# Patient Record
Sex: MALE | Race: BLACK OR AFRICAN AMERICAN
[De-identification: names, ages, dates, MRNs, and addresses within clinical notes are randomized per-mention and may not be internally consistent; named-entity substitution may affect disease eponyms.]

---

## 2017-03-07 ENCOUNTER — HOSPITAL ENCOUNTER (OUTPATIENT)
Dept: HOSPITAL 62 - RAD | Age: 67
End: 2017-03-07
Attending: UROLOGY
Payer: MEDICARE

## 2017-03-07 DIAGNOSIS — R31.21: Primary | ICD-10-CM

## 2017-03-07 PROCEDURE — 76770 US EXAM ABDO BACK WALL COMP: CPT

## 2017-07-24 ENCOUNTER — HOSPITAL ENCOUNTER (OUTPATIENT)
Dept: HOSPITAL 62 - OD | Age: 67
End: 2017-07-24
Attending: INTERNAL MEDICINE
Payer: MEDICARE

## 2017-07-24 DIAGNOSIS — J84.10: Primary | ICD-10-CM

## 2017-07-24 DIAGNOSIS — D68.0: ICD-10-CM

## 2017-07-24 PROCEDURE — 71250 CT THORAX DX C-: CPT

## 2017-07-24 PROCEDURE — 82340 ASSAY OF CALCIUM IN URINE: CPT

## 2017-07-24 PROCEDURE — 36415 COLL VENOUS BLD VENIPUNCTURE: CPT

## 2017-07-24 PROCEDURE — 82310 ASSAY OF CALCIUM: CPT

## 2017-07-24 PROCEDURE — 82164 ANGIOTENSIN I ENZYME TEST: CPT

## 2017-07-24 NOTE — RADIOLOGY REPORT (SQ)
EXAM DESCRIPTION:  CT CHEST WITHOUT



COMPLETED DATE/TIME:  7/24/2017 1:01 pm



REASON FOR STUDY:  J84.10 PULMONARY FIBROSIS J84.10  PULMONARY FIBROSIS, UNSPECIFIED



COMPARISON:  9/11/2014



TECHNIQUE:  CT scan performed of the chest without intravenous contrast.  Images reviewed with lung, 
soft tissue and bone windows.  Reconstructed coronal and sagittal MPR images reviewed.  All images st
ored on PACS.

All CT scanners at this facility use dose modulation, iterative reconstruction, and/or weight based d
osing when appropriate to reduce radiation dose to as low as reasonably achievable (ALARA).

[facility]: Dose Right  [facility]: CareDose    [facility]: Dose Right    [facility]: Teradose 4D    [facility]: Smart DotProduct



RADIATION DOSE:  Up-to-date CT equipment and radiation dose reduction techniques were employed. CTDIv
ol: 17.8 mGy. DLP: 613 mGy-cm. mGy.



LIMITATIONS:  No technical limitations.



FINDINGS:  LUNGS AND PLEURA: Paraseptal emphysematous changes in the left apex.  Stable areas of zuhair
pheral linear scarring.  Bronchiectasis in the lower lobes.  No honeycombing.  Calcified granuloma ri
ght lower lobe.  No suspicious nodules.  No effusions.

HILAR AND MEDIASTINAL STRUCTURES: No identified masses or abnormal nodes.  No obvious aneurysm.

HEART AND VASCULAR STRUCTURES: No aneurysm.  No pericardial effusion.

UPPER ABDOMEN: No significant findings.  Limited exam.

THYROID AND OTHER SOFT TISSUES: No masses.  No adenopathy.

BONES: No significant finding.

HARDWARE: None in the chest.

OTHER: No other significant findings.



IMPRESSION:  Chronic interstitial lung disease and bronchiectasis.  No fibrosis.



TECHNICAL DOCUMENTATION:  JOB ID:  [id number]

Quality ID # [id number]: Final reports with documentation of one or more dose reduction techniques (e.g., Au
tomated exposure control, adjustment of the mA and/or kV according to patient size, use of iterative 
reconstruction technique)

 2011 [employer]- All Rights Reserved

## 2017-12-08 ENCOUNTER — HOSPITAL ENCOUNTER (OUTPATIENT)
Dept: HOSPITAL 62 - ER | Age: 67
Setting detail: OBSERVATION
LOS: 1 days | Discharge: HOME | End: 2017-12-09
Attending: INTERNAL MEDICINE | Admitting: INTERNAL MEDICINE
Payer: MEDICARE

## 2017-12-08 DIAGNOSIS — E78.00: ICD-10-CM

## 2017-12-08 DIAGNOSIS — I10: ICD-10-CM

## 2017-12-08 DIAGNOSIS — I25.10: ICD-10-CM

## 2017-12-08 DIAGNOSIS — Z87.891: ICD-10-CM

## 2017-12-08 DIAGNOSIS — R07.89: Primary | ICD-10-CM

## 2017-12-08 DIAGNOSIS — M19.90: ICD-10-CM

## 2017-12-08 DIAGNOSIS — Z79.01: ICD-10-CM

## 2017-12-08 DIAGNOSIS — I82.509: ICD-10-CM

## 2017-12-08 DIAGNOSIS — E11.51: ICD-10-CM

## 2017-12-08 DIAGNOSIS — Z82.49: ICD-10-CM

## 2017-12-08 DIAGNOSIS — I48.2: ICD-10-CM

## 2017-12-08 DIAGNOSIS — K21.9: ICD-10-CM

## 2017-12-08 DIAGNOSIS — G47.33: ICD-10-CM

## 2017-12-08 DIAGNOSIS — Z79.899: ICD-10-CM

## 2017-12-08 LAB
ALBUMIN SERPL-MCNC: 4.3 G/DL (ref 3.5–5)
ALP SERPL-CCNC: 44 U/L (ref 38–126)
ALT SERPL-CCNC: 28 U/L (ref 21–72)
ANION GAP SERPL CALC-SCNC: 10 MMOL/L (ref 5–19)
APTT BLD: 33 SEC (ref 23.5–35.8)
APTT BLD: 34.6 SEC (ref 23.5–35.8)
AST SERPL-CCNC: 25 U/L (ref 17–59)
BASOPHILS # BLD AUTO: 0 10^3/UL (ref 0–0.2)
BASOPHILS NFR BLD AUTO: 1 % (ref 0–2)
BILIRUB DIRECT SERPL-MCNC: 0.4 MG/DL (ref 0–0.4)
BILIRUB SERPL-MCNC: 0.7 MG/DL (ref 0.2–1.3)
BUN SERPL-MCNC: 16 MG/DL (ref 7–20)
CALCIUM: 9.2 MG/DL (ref 8.4–10.2)
CHLORIDE SERPL-SCNC: 102 MMOL/L (ref 98–107)
CK MB SERPL-MCNC: 2.53 NG/ML (ref ?–4.55)
CK SERPL-CCNC: 138 U/L (ref 55–170)
CO2 SERPL-SCNC: 33 MMOL/L (ref 22–30)
CREAT SERPL-MCNC: 0.97 MG/DL (ref 0.52–1.25)
EOSINOPHIL # BLD AUTO: 0.1 10^3/UL (ref 0–0.6)
EOSINOPHIL NFR BLD AUTO: 1.6 % (ref 0–6)
ERYTHROCYTE [DISTWIDTH] IN BLOOD BY AUTOMATED COUNT: 16.5 % (ref 11.5–14)
GLUCOSE SERPL-MCNC: 90 MG/DL (ref 75–110)
HCT VFR BLD CALC: 44.8 % (ref 37.9–51)
HGB BLD-MCNC: 14.9 G/DL (ref 13.5–17)
HGB HCT DIFFERENCE: -0.1
LYMPHOCYTES # BLD AUTO: 2.6 10^3/UL (ref 0.5–4.7)
LYMPHOCYTES NFR BLD AUTO: 51.7 % (ref 13–45)
MAGNESIUM SERPL-MCNC: 1.9 MG/DL (ref 1.6–2.3)
MCH RBC QN AUTO: 27.1 PG (ref 27–33.4)
MCHC RBC AUTO-ENTMCNC: 33.2 G/DL (ref 32–36)
MCV RBC AUTO: 82 FL (ref 80–97)
MONOCYTES # BLD AUTO: 0.3 10^3/UL (ref 0.1–1.4)
MONOCYTES NFR BLD AUTO: 6.7 % (ref 3–13)
NEUTROPHILS # BLD AUTO: 2 10^3/UL (ref 1.7–8.2)
NEUTS SEG NFR BLD AUTO: 39 % (ref 42–78)
POTASSIUM SERPL-SCNC: 3.6 MMOL/L (ref 3.6–5)
PROT SERPL-MCNC: 7.3 G/DL (ref 6.3–8.2)
PROTHROMBIN TIME: 23.6 SEC (ref 11.4–15.4)
PROTHROMBIN TIME: 24.2 SEC (ref 11.4–15.4)
RBC # BLD AUTO: 5.49 10^6/UL (ref 4.35–5.55)
SODIUM SERPL-SCNC: 144.8 MMOL/L (ref 137–145)
TROPONIN I SERPL-MCNC: < 0.012 NG/ML
WBC # BLD AUTO: 5.1 10^3/UL (ref 4–10.5)

## 2017-12-08 PROCEDURE — 93010 ELECTROCARDIOGRAM REPORT: CPT

## 2017-12-08 PROCEDURE — 83735 ASSAY OF MAGNESIUM: CPT

## 2017-12-08 PROCEDURE — 85610 PROTHROMBIN TIME: CPT

## 2017-12-08 PROCEDURE — 85730 THROMBOPLASTIN TIME PARTIAL: CPT

## 2017-12-08 PROCEDURE — 85025 COMPLETE CBC W/AUTO DIFF WBC: CPT

## 2017-12-08 PROCEDURE — 82553 CREATINE MB FRACTION: CPT

## 2017-12-08 PROCEDURE — 80048 BASIC METABOLIC PNL TOTAL CA: CPT

## 2017-12-08 PROCEDURE — 71010: CPT

## 2017-12-08 PROCEDURE — 84484 ASSAY OF TROPONIN QUANT: CPT

## 2017-12-08 PROCEDURE — 80053 COMPREHEN METABOLIC PANEL: CPT

## 2017-12-08 PROCEDURE — 93005 ELECTROCARDIOGRAM TRACING: CPT

## 2017-12-08 PROCEDURE — 82550 ASSAY OF CK (CPK): CPT

## 2017-12-08 PROCEDURE — 99285 EMERGENCY DEPT VISIT HI MDM: CPT

## 2017-12-08 PROCEDURE — 36415 COLL VENOUS BLD VENIPUNCTURE: CPT

## 2017-12-08 RX ADMIN — METOPROLOL TARTRATE SCH MG: 25 TABLET, FILM COATED ORAL at 22:21

## 2017-12-08 NOTE — PDOC H&P
History of Present Illness


Admission Date/PCP: 


  12/08/17 05:40





  DARIEN GUERRERO





Patient complains of: Chest pain


History of Present Illness: 


CARLOTTA KELLEY is a 66 year old male known to my practice who presented to 

ED with chest pain that started about 45 minutes prior to arrival. Patient 

reported that he was recently started on CPAP machine for obstructive sleep 

apnea. he woke up from sleep due to onset of his chest pain. Patient localized 

pain to left side of his chest without any associated radiation to left upper 

extremity, palpitation, shortness of breath, nausea or diaphoresis. he has 

history of Atrial fibrillation and have been compliant with Coumadin therapy. 

Patient reported compliance with CPAP usage. Spouse reported that she noticed 

his abdomen have been bigger than usual since he started using the CPAP 

machine. Patient has history of GERD among his morbidities. His initial 

evaluation in the ED was remarkable for atrial fibrillation with rapid 

ventricular rate. He reported recent stress test about 16 months ago that was 

unrevealing with regard to ischemic issue. His morbidities include chronic 

atrial fibrillation, Hypertension, Hyperlipidemia, Peripheral Vascular Disease, 

Obstructive sleep apnea, Asthma, GERD, and Osteoarthritis. He reported 

significant cardiovascular disease including Hypertension, CAD, DM Type 2, and 

Hyperlipidemia.





Past Medical History


Cardiac Medical History: Reports: Atrial Fibrillation, Hyperlipidema, 

Hypertension, Peripheral Vascular Disease


Pulmonary Medical History: Reports: Asthma


   Denies: Tuberculosis


GI Medical History: Reports: Gastroesophageal Reflux Disease


Musculoskeltal Medical History: Reports: Arthritis





Past Surgical History


Past Surgical History: Reports: Vascular Surgery


   Denies: Pacemaker





Social History


Smoking Status: Former Smoker


Frequency of Alcohol Use: None


Hx Recreational Drug Use: No


Hx Prescription Drug Abuse: No





Family History


Family History: CAD, DM, Hyperlipidemia, Hypertension


Parental Family History Reviewed: Yes


Children Family History Reviewed: Yes


Sibling(s) Family History Reviewed.: Yes





Medication/Allergy


Home Medications: 








Warfarin Sodium 10 mg PO SUMOTUTHFRSA@1000 09/11/14 


Warfarin Sodium 15 mg PO WE@1000 09/11/14 


Losartan Potassium 50 mg PO DAILY #30 tablet 09/12/14 


Metoprolol Tartrate [Lopressor 25 mg Tablet] 25 mg PO Q12 #60 tablet 09/12/14 


Duloxetine HCl [Cymbalta 20 Mg Capsule.Dr] 40 mg PO QHS 12/08/17 


Hydrochlorothiazide [Hydrodiuril 12.5 mg Capsule] 12.5 mg PO QAM 12/08/17 


Pantoprazole Sodium [Protonix] 20 mg PO Q6AM 12/08/17 








Allergies/Adverse Reactions: 


 





No Known Allergies Allergy (Verified 12/08/17 06:23)


 











Review of Systems


Constitutional: ABSENT: chills, fever(s), headache(s), weight gain, weight loss


Eyes: ABSENT: visual disturbances


Ears: ABSENT: hearing changes


Nose, Mouth, and Throat: ABSENT: headache(s), mouth pain, sore throat, vertigo


Cardiovascular: PRESENT: chest pain.  ABSENT: as per HPI, dyspnea on exertion, 

edema, orthropnea, palpitations, other


Respiratory: ABSENT: cough, hemoptysis


Gastrointestinal: ABSENT: abdominal pain, constipation, diarrhea, hematemesis, 

hematochezia, nausea, vomiting


Genitourinary: ABSENT: dysuria, hematuria


Musculoskeletal: ABSENT: joint swelling


Integumentary: ABSENT: rash, wounds


Neurological: ABSENT: abnormal gait, abnormal speech, confusion, dizziness, 

focal weakness, syncope


Psychiatric: ABSENT: anxiety, depression, homidical ideation, suicidal ideation


Endocrine: ABSENT: cold intolerance, heat intolerance, menstrual abnormalities, 

polydipsia, polyuria


Hematologic/Lymphatic: ABSENT: easy bleeding, easy bruising, lymphadenopathy


Allergic/Immunologic: ABSENT: seasonal rhinorrhea





Physical Exam


Vital Signs: 


 











Temp Pulse Resp BP Pulse Ox


 


 97.7 F   76   18   127/74 H  97 


 


 12/08/17 15:51  12/08/17 15:51  12/08/17 15:51  12/08/17 15:51  12/08/17 15:51








 Intake & Output











 12/07/17 12/08/17 12/09/17





 06:59 06:59 06:59


 


Weight   117.9 kg











General appearance: PRESENT: no acute distress, cooperative, obese


Head exam: PRESENT: atraumatic, normocephalic


Eye exam: PRESENT: conjunctiva pink, EOMI, PERRLA.  ABSENT: scleral icterus


Ear exam: PRESENT: normal external ear exam


Mouth exam: PRESENT: moist, tongue midline


Throat exam: ABSENT: post pharyngeal erythema, tonsillar erythema, tonsillar 

exudate, tonsillogmegaly


Neck exam: PRESENT: full ROM.  ABSENT: carotid bruit, JVD, lymphadenopathy, 

thyromegaly


Respiratory exam: PRESENT: clear to auscultation mayito


Cardiovascular exam: PRESENT: RRR, +S1, +S2.  ABSENT: diastolic murmur, rubs, 

systolic murmur


Pulses: PRESENT: normal dorsalis pedis pul, +2 pedal pulses bilateral


Vascular exam: PRESENT: normal capillary refill.  ABSENT: pallor


GI/Abdominal exam: PRESENT: normal bowel sounds, soft.  ABSENT: distended, 

guarding, mass, organolmegaly, rebound, tenderness


Rectal exam: PRESENT: deferred


Extremities exam: ABSENT: pedal edema - support


Musculoskeletal exam: PRESENT: ambulatory, normal inspection.  ABSENT: 

tenderness


Neurological exam: PRESENT: alert, awake, oriented to person, oriented to place

, oriented to time, oriented to situation, CN II-XII grossly intact.  ABSENT: 

motor sensory deficit


Psychiatric exam: PRESENT: appropriate affect, normal mood.  ABSENT: homicidal 

ideation, suicidal ideation


Skin exam: PRESENT: dry, intact, warm.  ABSENT: cyanosis, rash





Results


Laboratory Results: 


I reviewed hos laboratory results on [employer] and form significant part of my 

medical decision making. 











  12/08/17 12/08/17





  07:58 14:52


 


Troponin I  0.032  0.041











Impressions: 


 





Chest X-Ray  12/08/17 03:51


IMPRESSION:


 


No acute cardiopulmonary findings.


 


 


 2011 [employer]- All Rights Reserved


 














Assessment & Plan





- Diagnosis


(1) Chest pain, rule out acute myocardial infarction


Is this a current diagnosis for this admission?: Yes   


Plan: 


See attending physician orders.








(2) Chronic atrial fibrillation with rapid ventricular response


Is this a current diagnosis for this admission?: Yes   


Plan: 


See attending physician orders.








(3) Obstructive sleep apnea on CPAP


Is this a current diagnosis for this admission?: Yes   


Plan: 


See attending physician orders.








(4) GERD (gastroesophageal reflux disease)


Qualifiers: 


   Esophagitis presence: without esophagitis   Qualified Code(s): K21.9 - Gastro

-esophageal reflux disease without esophagitis   


Is this a current diagnosis for this admission?: Yes   


Plan: 


See attending physician orders.








(5) HTN (hypertension)


Qualifiers: 


   Hypertension type: essential hypertension   Qualified Code(s): I10 - 

Essential (primary) hypertension   


Is this a current diagnosis for this admission?: Yes   


Plan: 


See attending physician orders.








(6) HLD (hyperlipidemia)


Qualifiers: 


   Hyperlipidemia type: pure hypercholesterolemia   Qualified Code(s): E78.00 - 

Pure hypercholesterolemia, unspecified; E78.0 - Pure hypercholesterolemia   


Is this a current diagnosis for this admission?: Yes   


Plan: 


See attending physician orders.








(7) DVT (deep venous thrombosis)


Qualifiers: 


   DVT location: lower extremity   Chronicity: chronic   Laterality: 

unspecified laterality 


Is this a current diagnosis for this admission?: Yes   


Plan: 


See attending physician orders.








- Time


Time Spent: 50 to 70 Minutes


Medications reviewed and adjusted accordingly: Yes


Anticipated discharge: Home


Within: within 24 hours





- Plan Summary


Plan Summary: 





See attending physician orders.

## 2017-12-08 NOTE — RADIOLOGY REPORT (SQ)
EXAM DESCRIPTION: 



CHEST SINGLE VIEW



CLINICAL HISTORY: 



66 years, Male, chest  pain



COMPARISON:

9/11/2014.



NUMBER OF VIEWS:



1.



TECHNIQUE:

Frontal



LIMITATIONS:

None.



FINDINGS:



Moderate lung volume, small stable atelectasis or scar of the

right mid lung field compared with exam from September 2014,

normal cardiac silhouette size, and intact bony thorax. 



IMPRESSION:



No acute cardiopulmonary findings.

 



 2011 [employer]- All Rights Reserved

## 2017-12-08 NOTE — ER DOCUMENT REPORT
ED General





- General


Chief Complaint: Chest Pain


Stated Complaint: CHEST PAIN


Time Seen by Provider: 12/08/17 03:52


TRAVEL OUTSIDE OF THE [place] IN LAST 30 DAYS: No





- HPI


Patient complains to provider of: Chest pain


Notes: 





Patient coming in with chest pain chest tightness and of the chest woke up from 

his sleep approximate 45 minutes prior to arrival to the ER.  Patient states 

does have a history of pain like this before patient states he has had a stress 

test greater than a year ago states otherwise negative no stents no history of 

MI.  Patient does have a history of hypertension.  Patient also has a history 

of atrial fibrillation and is on Coumadin.  Denies any diaphoresis shortness of 

breath denies any trauma or recent travel.  Patient resting comfortably upon my 

evaluation states chest pain is mostly resolved at this time.





- Related Data


Allergies/Adverse Reactions: 


 





No Known Allergies Allergy (Verified 09/29/16 11:08)


 











Past Medical History





- Social History


Smoking Status: Never Smoker


Chew tobacco use (# tins/day): No


Frequency of alcohol use: None


Drug Abuse: None


Family History: CAD, DM, Hyperlipidemia, Hypertension


Patient has suicidal ideation: No


Patient has homicidal ideation: No





- Past Medical History


Cardiac Medical History: Reports: Hx Atrial Fibrillation, Hx 

Hypercholesterolemia, Hx Hypertension, Hx Peripheral Vascular Disease


Pulmonary Medical History: Reports: Hx Asthma


   Denies: Hx Tuberculosis


Renal/ Medical History: Denies: Hx Peritoneal Dialysis


GI Medical History: Reports: Hx Gastroesophageal Reflux Disease


Musculoskeltal Medical History: Reports Hx Arthritis


Past Surgical History: Reports: Hx Vascular Surgery.  Denies: Hx Pacemaker





- Immunizations


Immunizations up to date: Yes


Hx Diphtheria, Pertussis, Tetanus Vaccination: Yes


Hx Pneumococcal Vaccination: 10/01/11





Review of Systems





- Review of Systems


Constitutional: No symptoms reported


EENT: No symptoms reported


Cardiovascular: Chest pain


Respiratory: No symptoms reported


Gastrointestinal: No symptoms reported


Genitourinary: No symptoms reported


Male Genitourinary: No symptoms reported


Musculoskeletal: No symptoms reported


Skin: No symptoms reported


Hematologic/Lymphatic: No symptoms reported


Neurological/Psychological: No symptoms reported


-: Yes All other systems reviewed and negative





Physical Exam





- Vital signs


Vitals: 


 











Pulse Ox


 


 98 


 


 12/08/17 03:51











Interpretation: Normal





- General


General appearance: Appears well, Alert





- HEENT


Head: Normocephalic, Atraumatic


Eyes: Normal


Pupils: PERRL





- Respiratory


Respiratory status: No respiratory distress


Chest status: Nontender


Breath sounds: Normal


Chest palpation: Normal





- Cardiovascular


Rhythm: Regular


Heart sounds: Normal auscultation


Murmur: No





- Abdominal


Inspection: Normal


Distension: No distension


Bowel sounds: Normal


Tenderness: Nontender


Organomegaly: No organomegaly





- Back


Back: Normal, Nontender





- Extremities


General upper extremity: Normal inspection, Nontender, Normal color, Normal ROM

, Normal temperature


General lower extremity: Normal inspection, Nontender, Normal color, Normal ROM

, Normal temperature, Normal weight bearing.  No: Artis's sign





- Neurological


Neuro grossly intact: Yes


Cognition: Normal


Orientation: AAOx4


[place] Coma Scale Eye Opening: Spontaneous


[place] Coma Scale Verbal: Oriented


[place] Coma Scale Motor: Obeys Commands


[place]ime Coma Scale Total: 15


Speech: Normal


Motor strength normal: LUE, RUE, LLE, RLE


Sensory: Normal





- Psychological


Associated symptoms: Normal affect, Normal mood





- Skin


Skin Temperature: Warm


Skin Moisture: Dry


Skin Color: Normal





Course





- Re-evaluation


Re-evalutation: 





12/08/17 06:20


EKG troponin negative chest x-ray is also negative.  Discussed with PCP will 

admit the patient for further cardiac evaluation





- Vital Signs


Vital signs: 


 











Temp Pulse Resp BP Pulse Ox


 


 98.3 F   67   20   125/81   96 


 


 12/08/17 04:03  12/08/17 04:03  12/08/17 05:01  12/08/17 05:01  12/08/17 05:01














- Laboratory


Result Diagrams: 


 12/08/17 04:34





 12/08/17 04:34


Laboratory results interpreted by me: 


 











  12/08/17 12/08/17 12/08/17





  04:34 04:34 04:34


 


RDW  16.5 H  


 


Seg Neutrophils %  39.0 L  


 


Lymphocytes %  51.7 H  


 


PT    23.6 H


 


Carbon Dioxide   33 H 














Discharge





- Discharge


Clinical Impression: 


 Chest pain, rule out acute myocardial infarction





Condition: Good


Disposition: ADMITTED AS OBSERVATION


Admitting Provider: Matheus


Unit Admitted: Telemetry


Referrals: 


DARIEN GUERRERO MD [Primary Care Provider] - Follow up as needed

## 2017-12-09 VITALS — SYSTOLIC BLOOD PRESSURE: 134 MMHG | DIASTOLIC BLOOD PRESSURE: 76 MMHG

## 2017-12-09 LAB
ANION GAP SERPL CALC-SCNC: 7 MMOL/L (ref 5–19)
BASOPHILS # BLD AUTO: 0.1 10^3/UL (ref 0–0.2)
BASOPHILS NFR BLD AUTO: 1.3 % (ref 0–2)
BUN SERPL-MCNC: 13 MG/DL (ref 7–20)
CALCIUM: 8.9 MG/DL (ref 8.4–10.2)
CHLORIDE SERPL-SCNC: 104 MMOL/L (ref 98–107)
CK MB SERPL-MCNC: 1.72 NG/ML (ref ?–4.55)
CK SERPL-CCNC: 111 U/L (ref 55–170)
CO2 SERPL-SCNC: 30 MMOL/L (ref 22–30)
CREAT SERPL-MCNC: 0.78 MG/DL (ref 0.52–1.25)
EOSINOPHIL # BLD AUTO: 0.1 10^3/UL (ref 0–0.6)
EOSINOPHIL NFR BLD AUTO: 2.6 % (ref 0–6)
ERYTHROCYTE [DISTWIDTH] IN BLOOD BY AUTOMATED COUNT: 16.6 % (ref 11.5–14)
GLUCOSE SERPL-MCNC: 92 MG/DL (ref 75–110)
HCT VFR BLD CALC: 42.6 % (ref 37.9–51)
HGB BLD-MCNC: 14.4 G/DL (ref 13.5–17)
HGB HCT DIFFERENCE: 0.6
LYMPHOCYTES # BLD AUTO: 2.3 10^3/UL (ref 0.5–4.7)
LYMPHOCYTES NFR BLD AUTO: 49.8 % (ref 13–45)
MCH RBC QN AUTO: 27.7 PG (ref 27–33.4)
MCHC RBC AUTO-ENTMCNC: 33.9 G/DL (ref 32–36)
MCV RBC AUTO: 82 FL (ref 80–97)
MONOCYTES # BLD AUTO: 0.3 10^3/UL (ref 0.1–1.4)
MONOCYTES NFR BLD AUTO: 7.1 % (ref 3–13)
NEUTROPHILS # BLD AUTO: 1.8 10^3/UL (ref 1.7–8.2)
NEUTS SEG NFR BLD AUTO: 39.2 % (ref 42–78)
POTASSIUM SERPL-SCNC: 4 MMOL/L (ref 3.6–5)
PROTHROMBIN TIME: 24.6 SEC (ref 11.4–15.4)
RBC # BLD AUTO: 5.21 10^6/UL (ref 4.35–5.55)
SODIUM SERPL-SCNC: 141.4 MMOL/L (ref 137–145)
TROPONIN I SERPL-MCNC: < 0.012 NG/ML
WBC # BLD AUTO: 4.5 10^3/UL (ref 4–10.5)

## 2017-12-09 RX ADMIN — METOPROLOL TARTRATE SCH MG: 25 TABLET, FILM COATED ORAL at 09:12

## 2017-12-09 NOTE — PDOC DISCHARGE SUMMARY
General





- Admit/Disc Date/PCP


Admission Date/Primary Care Provider: 


  12/08/17 05:40





  DARIEN YOLANDA





Discharge Date: 12/09/17





- Discharge Diagnosis


(1) Chest pain, rule out acute myocardial infarction


Is this a current diagnosis for this admission?: Yes   





(2) Chronic atrial fibrillation with rapid ventricular response


Is this a current diagnosis for this admission?: Yes   





(3) Obstructive sleep apnea on CPAP


Is this a current diagnosis for this admission?: Yes   





(4) GERD (gastroesophageal reflux disease)


Is this a current diagnosis for this admission?: Yes   





(5) HTN (hypertension)


Is this a current diagnosis for this admission?: Yes   





(6) HLD (hyperlipidemia)


Is this a current diagnosis for this admission?: Yes   





(7) DVT (deep venous thrombosis)


Is this a current diagnosis for this admission?: Yes   





- Additional Information


Home Medications: 








Warfarin Sodium 10 mg PO SUMOTUTHFRSA@1000 09/11/14 


Warfarin Sodium 15 mg PO WE@1000 09/11/14 


Losartan Potassium 50 mg PO DAILY #30 tablet 09/12/14 


Metoprolol Tartrate [Lopressor 25 mg Tablet] 25 mg PO Q12 #60 tablet 09/12/14 


Duloxetine HCl [Cymbalta 20 Mg Capsule.Dr] 40 mg PO QHS 12/08/17 


Hydrochlorothiazide [Hydrodiuril 12.5 mg Capsule] 12.5 mg PO QAM 12/08/17 


Pantoprazole Sodium [Protonix] 20 mg PO Q6AM 12/08/17 











History of Present Illness


History of Present Illness: 


CARLOTTA KELLEY is a 66 year old male known to my practice who presented to 

ED with chest pain that started about 45 minutes prior to arrival. Patient 

reported that he was recently started on CPAP machine for obstructive sleep 

apnea. he woke up from sleep due to onset of his chest pain. Patient localized 

pain to left side of his chest without any associated radiation to left upper 

extremity, palpitation, shortness of breath, nausea or diaphoresis. he has 

history of Atrial fibrillation and have been compliant with Coumadin therapy. 

Patient reported compliance with CPAP usage. Spouse reported that she noticed 

his abdomen have been bigger than usual since he started using the CPAP 

machine. Patient has history of GERD among his morbidities. His initial 

evaluation in the ED was remarkable for atrial fibrillation with rapid 

ventricular rate. He reported recent stress test about 16 months ago that was 

unrevealing with regard to ischemic issue. His morbidities include chronic 

atrial fibrillation, Hypertension, Hyperlipidemia, Peripheral Vascular Disease, 

Obstructive sleep apnea, Asthma, GERD, and Osteoarthritis. He reported 

significant cardiovascular disease including Hypertension, CAD, DM Type 2, and 

Hyperlipidemia.





Hospital Course


Hospital Course: 


Patient chest pain resolved without any significant management intervention 

beyond administration of Aspirin and supplemental oxygen. His cardiac enzymes 

were within normal limits. He denied any chest pain or difficulty with 

breathing this morning. Patient was instructed to use CPAP machine mask 

appropriately and postural adjustment to reduce reflux recurrence.





Physical Exam


Vital Signs: 


 











Temp Pulse Resp BP Pulse Ox


 


 98.6 F   73   16   134/76 H  96 


 


 12/09/17 08:00  12/09/17 08:00  12/09/17 08:00  12/09/17 08:00  12/09/17 08:00








 Intake & Output











 12/08/17 12/09/17 12/10/17





 06:59 06:59 06:59


 


Intake Total  1708 


 


Balance  1708 


 


Weight  117.9 kg 











General appearance: PRESENT: no acute distress, obese


Head exam: PRESENT: atraumatic, normocephalic


Eye exam: PRESENT: conjunctiva pink, EOMI, PERRLA.  ABSENT: scleral icterus


Mouth exam: PRESENT: moist


Respiratory exam: PRESENT: clear to auscultation mayito


Cardiovascular exam: PRESENT: RRR.  ABSENT: diastolic murmur, rubs, systolic 

murmur


GI/Abdominal exam: PRESENT: normal bowel sounds, soft.  ABSENT: distended, 

guarding, mass, organolmegaly, rebound, tenderness


Extremities exam: ABSENT: pedal edema


Musculoskeletal exam: PRESENT: normal inspection


Neurological exam: PRESENT: alert, awake, oriented to person, oriented to place

, oriented to time, oriented to situation, CN II-XII grossly intact.  ABSENT: 

motor sensory deficit


Psychiatric exam: PRESENT: appropriate affect, normal mood.  ABSENT: homicidal 

ideation, suicidal ideation


Skin exam: PRESENT: dry, intact, warm.  ABSENT: cyanosis, rash





Results


Laboratory Results: 


 





 12/09/17 05:10 





 12/09/17 05:10 





 











  12/09/17 12/09/17





  05:10 05:10


 


WBC  4.5 


 


RBC  5.21 


 


Hgb  14.4 


 


Hct  42.6 


 


MCV  82 


 


MCH  27.7 


 


MCHC  33.9 


 


RDW  16.6 H 


 


Plt Count  154 


 


Seg Neutrophils %  39.2 L 


 


Lymphocytes %  49.8 H 


 


Monocytes %  7.1 


 


Eosinophils %  2.6 


 


Basophils %  1.3 


 


Absolute Neutrophils  1.8 


 


Absolute Lymphocytes  2.3 


 


Absolute Monocytes  0.3 


 


Absolute Eosinophils  0.1 


 


Absolute Basophils  0.1 


 


Sodium   141.4


 


Potassium   4.0


 


Chloride   104


 


Carbon Dioxide   30


 


Anion Gap   7


 


BUN   13


 


Creatinine   0.78


 


Est GFR ( Amer)   > 60


 


Est GFR (Non-Af Amer)   > 60


 


Glucose   92


 


Calcium   8.9








 











  12/08/17 12/08/17 12/09/17





  07:58 14:52 05:10


 


Creatine Kinase    111


 


CK-MB (CK-2)   


 


Troponin I  0.032  0.041 














  12/09/17





  05:10


 


Creatine Kinase 


 


CK-MB (CK-2)  1.72


 


Troponin I  < 0.012











Impressions: 


 





Chest X-Ray  12/08/17 03:51


IMPRESSION:


 


No acute cardiopulmonary findings.


 


 


 2011 [employer]- All Rights Reserved


 














Qualifiers


**PATEINT BEING DISCHARGED WITH ANY OF THE FOLLOWING DIAGNOSIS?: No





Plan


Discharge Plan: 


Discharge home today. Keep prior office appointment for followup on 01/23/2018 

at 11am.

## 2018-03-05 NOTE — RADIOLOGY REPORT (SQ)
EXAM DESCRIPTION:  U/S ABDOMEN COMPLETE W/O DOP



COMPLETED DATE/TIME:  3/5/2018 8:30 am



REASON FOR STUDY:  EPIGASTRIC PAIN (R10.13) R10.13  EPIGASTRIC PAIN R07.9  CHEST PAIN, UNSPECIFIED K4
4.9  DIAPHRAGMATIC HERNIA WITHOUT OBSTRUCTION OR GANGRENE



COMPARISON:  None.



TECHNIQUE:  Dynamic and static grayscale images acquired of the abdomen and recorded on PACS. Additio
nal selected color Doppler and spectral images recorded.



LIMITATIONS:  Study limited due to acoustical interference from fat or from air in the bowel.



FINDINGS:  PANCREAS: Obscured.

LIVER: Echotexture is coarse with increased echogenicity consistent with fatty infiltration.

LIVER VASCULATURE: Normal directional flow of the main portal vein and hepatic veins.

GALLBLADDER: No stones. Normal wall thickness. No pericholecystic fluid.

ULTRASOUND-DETECTED SCOTT'S SIGN: Negative.

INTRAHEPATIC DUCTS AND COMMON DUCT: CBD and intrahepatic ducts normal caliber. No filling defects.

INFERIOR VENA CAVA: Normal flow.

AORTA: No aneurysm.

RIGHT KIDNEY:  Normal size.   Normal echogenicity.   No solid or suspicious masses.   No hydronephros
is.   No calcifications.

LEFT KIDNEY:  Normal size.   Normal echogenicity.   No solid or suspicious masses.   No hydronephrosi
s.   No calcifications.

SPLEEN:Normal size. No solid masses.

PERITONEAL AND PLEURAL SPACES: No ascites or effusions.

OTHER: No other significant finding.



IMPRESSION:  Fatty liver.  No acute findings.



TECHNICAL DOCUMENTATION:  JOB ID:  [id number]

 2011 [employer]- All Rights Reserved



Reading location - IP/workstation name: [email or web address]

## 2018-03-05 NOTE — RADIOLOGY REPORT (SQ)
EXAM DESCRIPTION:  UGI SERIES



COMPLETED DATE/TIME:  3/5/2018 9:06 am



REASON FOR STUDY:  EPIGATRIC PAIN (R10.13), CHEST PAIN (R07.9), DIAGHRAGMATIC HERNIA (K44.9) R10.13  
EPIGASTRIC PAIN R07.9  CHEST PAIN, UNSPECIFIED K44.9  DIAPHRAGMATIC HERNIA WITHOUT OBSTRUCTION OR PAMELA
GRENE



COMPARISON:  CT LIMITED ABDOMEN 9/22/2016



TECHNIQUE:  Under fluoroscopic guidance, patient ingested effervescent granules followed by thick and
 thin barium.  Fluoroscopic spot images and routine radiographic images acquired and stored on PACS.

12 MM BARIUM TABLET GIVEN: Yes.

No significant delay in passage.



LIMITATIONS:  None.



FLUOROSCOPY TIME:  FLUORO TIME: 0.7 minutes

12 fluoroscopy images saved to PACS.



FINDINGS:  NEUROMUSCULAR COORDINATION OF SWALLOW: Normal.  No aspiration.

ESOPHAGEAL MOTILITY: Normal primary peristalsis.  Mild tertiary contractions noted throughout the mid
 and distal esophagus suggesting mild esophageal dysmotility.  No delay in passage of tablet.  No eso
phageal spasm.

ESOPHAGEAL MUCOSA: Normal mucosa without masses or ulceration.

GASTRO-ESOPHAGEAL JUNCTION: No hiatal hernia or reflux.

STOMACH: A mildly large stomach is identified with the fundus of the stomach positioned horizontally 
under the left hemidiaphragm.  No volvulus, hiatal hernia, or obstruction is seen.  The mucosa is nor
mal without masses or ulcerations.

GASTRIC OUTLET: No delay in emptying.  Normal pylorus.

DUODENAL BULB: Normal distention. No spasm or ulceration.

DUODENUM: Mucosa normal.  No extrinsic masses or malrotation.

PROXIMAL SMALL BOWEL: Mucosa normal.  No extrinsic masses or malrotation.

NON-GI TRACT STRUCTURES: No significant finding.

OTHER: No other significant finding.



IMPRESSION:  1. MILD ESOPHAGEAL DYSMOTILITY.  NO DELAY PASSING A BARIUM TABLET.

2.  MILDLY LARGE STOMACH WITH THE FUNDUS POSITIONED HORIZONTALLY UNDER THE LEFT HEMIDIAPHRAGM.  NO VO
LVULUS, HIATAL HERNIA, OBSTRUCTION, OR MUCOSAL DEFECTS.



COMMENT:  Quality :  Final reports for procedures using fluoroscopy that document radiation exp
osure indices, or exposure time and number of fluorographic images (if radiation exposure indices are
 not available)



TECHNICAL DOCUMENTATION:  JOB ID:  [id number]

 2011 [email or web address]- All Rights Reserved



Reading location - IP/workstation name: Counts include 234 beds at the [facility]

## 2018-05-08 NOTE — XCELERA REPORT
[address] [address]

                             Tel: [phone]

                             Fax: [phone]



                     Lower Extremity Venous Evaluation

____________________________________________________________________________



Name: CARLOTTA KELLEY

MRN: [id number]                Age: 67 yrs

Gender: Male                   : 1950

Patient Status: Outpatient     Patient Location: 

Account #: [id number]

Study Date: 2018 03:55 PM

Accession #: [id number]

____________________________________________________________________________



Procedure: Color flow and duplex imaging of the veins of the right lower

extremity as well as the left Common Femoral vein.

Reason For Study: RLE PAIN





Ordering Physician: DARIEN GUERRERO

Performed By: Kiesha Urena

____________________________________________________________________________



____________________________________________________________________________





Right Sided Venous Evaluation

Enlarged and no flow in upper thigh Greater Saphenous vein. Reflux present

in the deep veins.

Otherwise normal vessel filling wall to wall, compression and augmentation

as well as Colour flow down to the infrageniculate veins.



Left Sided Venous Evaluation

The left common femoral vein is fully compressible. Spontaneous and phasic

flow is present in the left common femoral vein.

____________________________________________________________________________





Interpretation Summary

No duplex evidence of DVT or obstruction in the right lower extremity nor

in the left Common Femoral vein. Superficial phlebitis in the Greater

Saphenous vein. The patient is noted to be on Warfarin.

____________________________________________________________________________



____________________________________________________________________________



Electronically signed by:      Lennox Williams      on 2018 07:38 PM



CC: DARIEN GUERRERO

>

Williams, Lennox

## 2018-09-10 NOTE — RADIOLOGY REPORT (SQ)
EXAM DESCRIPTION:  CT CHEST WITHOUT



COMPLETED DATE/TIME:  9/10/2018 9:00 am



REASON FOR STUDY:  BRONCHIESCTASIS J47.9  BRONCHIECTASIS, UNCOMPLICATED D86.0  SARCOIDOSIS OF LUNG



COMPARISON:  7/24/2017



TECHNIQUE:  CT scan performed of the chest without intravenous contrast.  Images reviewed with lung, 
soft tissue and bone windows.  Reconstructed coronal and sagittal MPR images reviewed.  All images st
ored on PACS.

All CT scanners at this facility use dose modulation, iterative reconstruction, and/or weight based d
osing when appropriate to reduce radiation dose to as low as reasonably achievable (ALARA).

[facility]: Dose Right  [facility]: CareDose    [facility]: Dose Right    [facility]: Teradose 4D    [facility]: Smart Technologies



RADIATION DOSE:  CT Rad equipment meets quality standard of care and radiation dose reduction techniq
ues were employed. CTDIvol: 18.5 mGy. DLP: 645 mGy-cm. mGy.



LIMITATIONS:  No technical limitations.



FINDINGS:  LUNGS AND PLEURA: Stable peripheral areas of bandlike scarring and associated bronchiectas
is.  No developing nodules or effusions.  No honeycombing.  Calcified granuloma right lower lobe.

HILAR AND MEDIASTINAL STRUCTURES: No identified masses or abnormal nodes.  No obvious aneurysm.

HEART AND VASCULAR STRUCTURES: No aneurysm.  No pericardial effusion.

UPPER ABDOMEN: No significant findings.  Limited exam.

THYROID AND OTHER SOFT TISSUES: No masses.  No adenopathy.

BONES: No significant finding.

HARDWARE: None in the chest.

OTHER: No other significant findings.



IMPRESSION:  Chronic interstitial lung disease and associated bronchiectasis.  No significant change.




TECHNICAL DOCUMENTATION:  JOB ID:  [id number]

Quality ID # 436: Final reports with documentation of one or more dose reduction techniques (e.g., Au
tomated exposure control, adjustment of the mA and/or kV according to patient size, use of iterative 
reconstruction technique)

 2011 [facility]- All Rights Reserved



Reading location - IP/workstation name: [email or web address]

## 2018-10-06 NOTE — RADIOLOGY REPORT (SQ)
EXAM DESCRIPTION:  CHEST SINGLE VIEW



COMPLETED DATE/TIME:  10/6/2018 8:28 pm



REASON FOR STUDY:  cp

Chest pain starting 1 hour ago



COMPARISON:  None.



EXAM PARAMETERS:  NUMBER OF VIEWS: One view.

TECHNIQUE: Single frontal radiographic view of the chest acquired.

RADIATION DOSE: NA

LIMITATIONS: None.



FINDINGS:  LUNGS AND PLEURA: Minimal bandlike scarring in the periphery of the right upper lobe and a
t both lateral costophrenic sulci.  No acute infiltrates.  No pleural effusion or pneumothorax.

MEDIASTINUM AND HILAR STRUCTURES: No masses.  Contour normal.

HEART AND VASCULAR STRUCTURES: Heart normal in size.  Normal vasculature.

BONES: No acute findings.

HARDWARE: None in the chest.

OTHER: No other significant finding.



IMPRESSION:  No acute changes.

Minimal stable bandlike scarring in the periphery of the right upper lobe and both lateral lung bases
.



TECHNICAL DOCUMENTATION:  JOB ID:  [id number]

 2011 [employer]- All Rights Reserved



Reading location - IP/workstation name: [email or web address]

## 2018-10-06 NOTE — ER DOCUMENT REPORT
ED General





- General


Chief Complaint: Palpitations


Stated Complaint: CHEST TIGHTNESS


Time Seen by Provider: 10/06/18 20:11


Notes: 





Patient is a 67-year-old male approximately 2 hours of palpitations.  Patient 

states that he was out and about today, did not take any of his medication 

including his metoprolol today as directed.  He states proximate 2 hours prior 

to arrival he began to feel a fluttering in his chest.  He denies any distinct 

chest pain or heaviness, states that sometimes the palpitations will get to a 

point where he felt there was a tightness in the chest but currently denies any 

sensation other than palpitations.  Denies any associated lightheadedness, 

syncope or shortness of breath.  He states this feels very similar to when his 

atrial fibrillation has gotten fast in the past.  He has not contacted his 

general doctor regarding today's concerns.  He has no known history of coronary 

artery disease, prior MI, DVT or pulmonary embolus.  Nothing is been noted to 

improve or worsen his symptoms since onset.


TRAVEL OUTSIDE OF THE [place] IN LAST 30 DAYS: No





- Related Data


Allergies/Adverse Reactions: 


 





No Known Allergies Allergy (Verified 12/08/17 06:23)


 











Past Medical History





- General


Information source: Patient





- Social History


Smoking Status: Never Smoker


Chew tobacco use (# tins/day): No


Frequency of alcohol use: None


Drug Abuse: None


Lives with: Spouse/Significant other


Family History: CAD, DM, Hyperlipidemia, Hypertension


Patient has suicidal ideation: No


Patient has homicidal ideation: No





- Past Medical History


Cardiac Medical History: Reports: Hx Atrial Fibrillation, Hx 

Hypercholesterolemia, Hx Hypertension, Hx Peripheral Vascular Disease


Pulmonary Medical History: Reports: Hx Asthma


   Denies: Hx Tuberculosis


Renal/ Medical History: Denies: Hx Peritoneal Dialysis


GI Medical History: Reports: Hx Gastroesophageal Reflux Disease


Musculoskeletal Medical History: Reports Hx Arthritis


Past Surgical History: Reports: Hx Vascular Surgery.  Denies: Hx Pacemaker





- Immunizations


Immunizations up to date: Yes


Hx Diphtheria, Pertussis, Tetanus Vaccination: Yes


Hx Pneumococcal Vaccination: 10/01/11





Review of Systems





- Review of Systems


Notes: 





Constitutional: Negative for fever.


HENT: Negative for sore throat.


Eyes: Negative for visual changes.


Cardiovascular: Positive for palpitations, negative for chest pain


Respiratory: Negative for shortness of breath.


Gastrointestinal: Negative for abdominal pain, vomiting or diarrhea.


Genitourinary: Negative for dysuria.


Musculoskeletal: Negative for back pain.


Skin: Negative for rash.


Neurological: Negative for headaches, weakness or numbness.





10 point ROS negative except as marked above and in HPI.





Physical Exam





- Vital signs


Vitals: 


 











Temp Pulse Resp BP Pulse Ox


 


 98.9 F   66   22 H  134/81 H  97 


 


 10/06/18 20:07  10/06/18 20:07  10/06/18 20:07  10/06/18 20:07  10/06/18 20:07











Interpretation: Tachycardic - Initial document heart rate of 66 is not accurate


Notes: 





PHYSICAL EXAMINATION:





GENERAL: Well-appearing, well-nourished and in no acute distress.





HEAD: Atraumatic, normocephalic.





EYES: Pupils equal round and reactive to light, extraocular movements intact, 

sclera anicteric, conjunctiva are normal.





ENT: nares patent, oropharynx clear without exudates.  Moist mucous membranes.





NECK: Normal range of motion, supple without lymphadenopathy





LUNGS: Breath sounds clear to auscultation bilaterally and equal.  No wheezes 

rales or rhonchi.





HEART: Irregular regular tachycardia without murmurs





ABDOMEN: Soft, nontender, normoactive bowel sounds.  No guarding, no rebound.  

No masses appreciated.





EXTREMITIES: Normal range of motion, no pitting or edema.  No cyanosis.





NEUROLOGICAL: No focal neurological deficits. Moves all extremities 

spontaneously and on command.





PSYCH: Normal mood, normal affect.





SKIN: Warm, Dry, normal turgor, no rashes or lesions noted.





Course





- Re-evaluation


Re-evalutation: 





10/06/18 21:08


Patient is a 67-year-old male with a past medical history of atrial 

fibrillation who presents with A. travis with rapid ventricular response.  His 

main complaint is palpitations.  He denies any chest pain contrary to triage 

assessment he states it is not a true tightness but more of a sensation of his 

heart going quickly.  Patient did not take any of his medications today, likely 

the trigger for him going into atrial fibrillation with rapid ventricular 

response.  Will give IV metoprolol 5 mg every 5 minutes as this is the patient'

s normal home medications and then given oral dose of metoprolol.  If I can 

achieve rate control plan for discharge home otherwise patient will require 

hospitalization.  EKG without ischemic changes.  Troponin and the remainder of 

blood work is pending.


10/06/18 22:48


Labs completely unremarkable.  Patient's heart rate has normalized currently 94 

after receiving metoprolol (Please note that the last recorded heart rate is 

from the monitor which in the setting of atrial fibrillation does not take an 

average of the heart rate, I did do a manual count at the bedside and it was 94/

1 minute). I have again strongly emphasized the patient the need to take all of 

his medications exactly as prescribed, have reviewed the risk of beta-blocker 

withdrawal with the patient.  He continues to be without any symptoms at this 

point, states he feels very well and would like to be discharged home.  At this 

time will discharge with return precautions and follow-up recommendations.  

Verbal discharge instructions given a the bedside and opportunity for questions 

given. Medication warnings reviewed. Patient is in agreement with this plan and 

has verbalized understanding of return precautions and the need for primary 

care follow-up in the next 24-72 hours.








- Vital Signs


Vital signs: 


 











Temp Pulse Resp BP Pulse Ox


 


 98.4 F   66   19   117/89 H  94 


 


 10/06/18 23:02  10/06/18 20:07  10/06/18 22:28  10/06/18 22:28  10/06/18 22:28














- Laboratory


Result Diagrams: 


 10/06/18 20:35





 10/06/18 21:15


Laboratory results interpreted by me: 


 











  10/06/18





  20:35


 


RDW  16.6 H














- Diagnostic Test


Radiology reviewed: Image reviewed, Reports reviewed


Radiology results interpreted by me: 





10/06/18 21:09


Chest x-ray: No acute infiltrate or pneumothorax





- EKG Interpretation by Me


Additional EKG results interpreted by me: 





10/06/18 21:09


Atrial fibrillation with rapid ventricular response.  No ST elevations or 

depressions.  QTC is 463.





Discharge





- Discharge


Clinical Impression: 


 Atrial fibrillation with rapid ventricular response, Palpitations





Condition: Good


Disposition: HOME, SELF-CARE


Additional Instructions: 


Please take all medications exactly as directed.  You were seen today because 

your A. fib was going too quickly.  Your labs and chest x-ray are normal today.

  Your heart rate has responded well to the medications here in the emergency 

department and you are safe for discharge home.  Please return to the emergency 

department immediately if you develop chest pain, shortness of breath, 

recurrence of palpitations, pass out, or have any other symptoms that are 

worrisome to you.


Referrals: 


DARIEN GUERRERO MD [Primary Care Provider] - Follow up as needed

## 2018-10-07 NOTE — EKG REPORT
SEVERITY:- ABNORMAL ECG -

A FIB WITH RVR



PROBABLE POSTERIOR INFARCT

:

Confirmed by: Rey Estrada 07-Oct-2018 19:15:09

## 2019-09-26 NOTE — RADIOLOGY REPORT (SQ)
EXAM DESCRIPTION:  CT CHEST WITHOUT



COMPLETED DATE/TIME:  9/26/2019 1:49 pm



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



COMPARISON:  CT chest 8/30/2011, 7/21/2012, 9/11/2014, 7/24/2017, 9/10/2018, 8/30/2011



TECHNIQUE:  CT scan performed of the chest without intravenous contrast.  Images reviewed with lung, 
soft tissue and bone windows.  Reconstructed coronal and sagittal MPR images reviewed.  All images st
ored on PACS.

All CT scanners at this facility use dose modulation, iterative reconstruction, and/or weight based d
osing when appropriate to reduce radiation dose to as low as reasonably achievable (ALARA).

[facility]: Dose Right  [facility]: CareDose    [facility]: Dose Right    [facility]: Teradose 4D    [facility]: Smart [employer]



RADIATION DOSE:  CT Rad equipment meets quality standard of care and radiation dose reduction techniq
ues were employed. CTDIvol: 18.7 mGy. DLP: 746 mGy-cm. mGy.



LIMITATIONS:  No technical limitations.



FINDINGS:  LUNGS AND PLEURA: Stable appearance of the lungs compared to 2011.  Bandlike scarring is p
resent at both upper lobes, along the minor fissure, and in both lower lobes.  There is stable bronch
iectasis in the bilateral lower lobe airways.  No acute infiltrates.  No pleural effusion.  No pneumo
thorax.  No worrisome pulmonary nodules.

HILAR AND MEDIASTINAL STRUCTURES: No identified masses or abnormal nodes.  No obvious aneurysm.

HEART AND VASCULAR STRUCTURES: Ascending aorta 4 cm in diameter, calcified aortic valve.  Correlate c
linically for aortic stenosis.

UPPER ABDOMEN: 2.2 cm nodule along the right mid-pole kidney, worrisome for a small solid renal neopl
asm.  Follow-up renal ultrasound is recommended.  This finding is best shown on axial image 63 and co
tavo image 60.

THYROID AND OTHER SOFT TISSUES: No masses.  No adenopathy.

BONES: No significant finding.

HARDWARE: None in the chest.

OTHER: No other significant findings.



IMPRESSION:  Stable appearance of the lung parenchyma compared to 2011

Calcified aortic valve with ectasia ascending thoracic aorta.  Correlate clinically for significant a
ortic stenosis

Incidental finding of a 2.2 cm nodule along the right upper pole kidney worrisome for a small solid n
eoplasm.  Follow-up dedicated renal ultrasound recommended.



TECHNICAL DOCUMENTATION:  JOB ID:  [id number]

Quality ID # 436: Final reports with documentation of one or more dose reduction techniques (e.g., Au
tomated exposure control, adjustment of the mA and/or kV according to patient size, use of iterative 
reconstruction technique)

 2011 [employer]- All Rights Reserved



Reading location - IP[facility]workstation name: [email or web address]

## 2019-10-04 NOTE — RADIOLOGY REPORT (SQ)
EXAM DESCRIPTION:  U/S ABDOMEN COMPLETE W/DOPPLER



COMPLETED DATE/TIME:  10/4/2019 11:53 am



REASON FOR STUDY:  NAUSEA R11.0  NAUSEA



COMPARISON:  9/26/2019 CT, ultrasound 3/7/2017



TECHNIQUE:  Dynamic and static grayscale images acquired of the abdomen and recorded on PACS. Additio
nal selected color Doppler and spectral images recorded.

Note:  Study does not meet criteria for complete doppler/duplex scan



LIMITATIONS:  Some structures not visualized.



FINDINGS:  PANCREAS: Nonvisualized secondary to overlying bowel gas.

LIVER: No focal lesions.  Increased echogenicity with decreased visualization of the portal triads.

LIVER VASCULATURE: Normal directional flow of the main portal vein and hepatic veins.

GALLBLADDER: No stones. Normal wall thickness. No pericholecystic fluid.

ULTRASOUND-DETECTED SCOTT'S SIGN: Negative.

INTRAHEPATIC DUCTS AND COMMON DUCT: CBD and intrahepatic ducts normal caliber. No filling defects.

INFERIOR VENA CAVA: Normal flow.

AORTA: No aneurysm.

RIGHT KIDNEY:  Normal in size measuring 11.7 cm.   Normal echogenicity.   No solid or suspicious mass
es.  Lower pole cyst measuring 2.4 cm, stable compared to 2017.  No hydronephrosis.   No calcificatio
ns.

LEFT KIDNEY:  Normal size measuring 12.7 cm.   Normal echogenicity.   No solid or suspicious masses. 
  No hydronephrosis.   No calcifications.

SPLEEN: Normal size measuring 9.5 cm. No solid masses.

PERITONEAL AND PLEURAL SPACES: No ascites or effusions.

OTHER: No other significant finding.



IMPRESSION:  Hepatic steatosis.  Otherwise, unremarkable abdominal ultrasound as visualized.



TECHNICAL DOCUMENTATION:  JOB ID:  [id number]

 2011 [employer]- All Rights Reserved



Reading location - IP/workstation name: [email or web address]

## 2019-12-31 NOTE — EKG REPORT
SEVERITY:- BORDERLINE ECG -

SINUS RHYTHM

TALL R WAVE IN V2, CONSIDER RVH OR PMI

:

Confirmed by: Jesus Damon MD 31-Dec-2019 13:52:31

## 2019-12-31 NOTE — ER DOCUMENT REPORT
ED Medical Screen (RME)





- General


Chief Complaint: Chest Tightness


Stated Complaint: CHEST TIGHTNESS


Time Seen by Provider: 12/31/19 12:30


Primary Care Provider: 


DARIEN GUERRERO MD [Primary Care Provider] - Follow up as needed


Mode of Arrival: Ambulatory


Information source: Patient


Notes: 





69-year-old male with history of A. fib DVTs presents emergency department with 

complaints of chest tightness that makes him feel short of breath, headache that

comes and goes and blurred vision that comes and goes.  Reports symptoms started

yesterday.  Denies fever vomiting diarrhea.  Offered Tylenol for the headache 

but reports he does not have a headache at this time.  Reports the chest 

tightness is constant.  History of A. fib taking Coumadin.














I have greeted and performed a rapid initial assessment of this patient.  A 

comprehensive ED assessment and evaluation of the patient, analysis of test 

results and completion of the medical decision making process will be conducted 

by additional ED providers.


TRAVEL OUTSIDE OF THE [place] IN LAST 30 DAYS: No





- Related Data


Allergies/Adverse Reactions: 


                                        





No Known Allergies Allergy (Verified 12/31/19 12:38)


   











Past Medical History





- Social History


Chew tobacco use (# tins/day): No


Frequency of alcohol use: None


Drug Abuse: None





- Past Medical History


Cardiac Medical History: Reports: Hx Atrial Fibrillation, Hx 

Hypercholesterolemia, Hx Hypertension, Hx Peripheral Vascular Disease


Pulmonary Medical History: Reports: Hx Asthma


   Denies: Hx Tuberculosis


Renal/ Medical History: Denies: Hx Peritoneal Dialysis


GI Medical History: Reports: Hx Gastroesophageal Reflux Disease


Musculoskeltal Medical History: Reports Hx Arthritis


Past Surgical History: Reports: Hx Vascular Surgery.  Denies: Hx Pacemaker





- Immunizations


Immunizations up to date: Yes


Hx Diphtheria, Pertussis, Tetanus Vaccination: Yes





Physical Exam





- Vital signs


Vitals: 





                                        











Temp Pulse Resp BP Pulse Ox


 


 99.1 F   72   20   143/83 H  96 


 


 12/31/19 12:29  12/31/19 12:29  12/31/19 12:29 12/31/19 12:29 12/31/19 12:29














Course





- Vital Signs


Vital signs: 





                                        











Temp Pulse Resp BP Pulse Ox


 


 99.1 F   72   20   143/83 H  96 


 


 12/31/19 12:29  12/31/19 12:29  12/31/19 12:29  12/31/19 12:29  12/31/19 12:29














Doctor's Discharge





- Discharge


Referrals: 


DARIEN GUERRERO MD [Primary Care Provider] - Follow up as needed

## 2019-12-31 NOTE — ER DOCUMENT REPORT
ED General





- General


Chief Complaint: Chest Pain


Stated Complaint: CHEST TIGHTNESS


Time Seen by Provider: 12/31/19 12:30


Primary Care Provider: 


DARIEN GUERRERO MD [Primary Care Provider] - Follow up as needed


Mode of Arrival: Ambulatory


TRAVEL OUTSIDE OF THE [place] IN LAST 30 DAYS: No





- HPI


Notes: 





Patient is a 69-year-old male who presents to the emergency department for 

evaluation of headache and chest pain.  He states the headache started yesterday

.  The headache is on the right side of his head, and a very well-circumscribed 

point on the right parietal scalp.  He states that the pain comes on, lasts only

1 to 2 seconds at a time, then completely resolved.  He describes some 

associated blurred vision with it, but again only last 1 or 2 seconds.  He 

denies any relieving or aggravating factors.  No recent head injury.  He states 

he is also had some occasional chest tightness.  He states that he had an 

episode that lasted about 30 minutes earlier today.  He states that this is not 

an abnormal symptom for him, he has had this multiple times.  He states that he 

has absolutely no chest pressure or chest pain now.  He denies any associated s

ymptoms with this chest pressure.





- Related Data


Allergies/Adverse Reactions: 


                                        





No Known Allergies Allergy (Verified 12/31/19 12:38)


   








Home Medications: Losartan, Coumadin, rosuvastatin, duloxetine, metoprolol





Past Medical History





- General


Information source: Patient





- Social History


Smoking Status: Never Smoker


Chew tobacco use (# tins/day): No


Frequency of alcohol use: None


Drug Abuse: None


Family History: CAD, DM, Hyperlipidemia, Hypertension


Patient has suicidal ideation: No


Patient has homicidal ideation: No





- Past Medical History


Cardiac Medical History: Reports: Hx Atrial Fibrillation, Hx DVT, Hx 

Hypercholesterolemia, Hx Hypertension, Hx Peripheral Vascular Disease


Pulmonary Medical History: Reports: Hx Asthma


   Denies: Hx Tuberculosis


Renal/ Medical History: Denies: Hx Peritoneal Dialysis


GI Medical History: Reports: Hx Gastroesophageal Reflux Disease


Musculoskeletal Medical History: Reports Hx Arthritis


Past Surgical History: Reports: Hx Vascular Surgery.  Denies: Hx Pacemaker





- Immunizations


Immunizations up to date: Yes


Hx Diphtheria, Pertussis, Tetanus Vaccination: Yes


Hx Pneumococcal Vaccination: 10/01/11





Review of Systems





- Review of Systems


Constitutional: No symptoms reported


EENT: No symptoms reported


Cardiovascular: See HPI


Respiratory: See HPI


Gastrointestinal: No symptoms reported


Genitourinary: No symptoms reported


Musculoskeletal: No symptoms reported


Skin: No symptoms reported


Neurological/Psychological: See HPI





Physical Exam





- Vital signs


Vitals: 


                                        











Temp Pulse Resp BP Pulse Ox


 


 99.1 F   72   20   143/83 H  96 


 


 12/31/19 12:29  12/31/19 12:29  12/31/19 12:29  12/31/19 12:29  12/31/19 12:29














- Notes


Notes: 





This is a very pleasant 69-year-old gentleman who appears his stated age, no 

acute distress.  Vital signs reviewed, please refer to chart. Head is 

normocephalic, atraumatic.  He does have some point tenderness to palpation over

the right parietal scalp, overlying the masseter muscle.  Pupils equal round, 

reactive to light.  Neck is supple without meningismus.  Heart is regular rate 

and rhythm.  Lungs are clear to auscultation bilaterally.  Abdomen is soft, 

nontender, normoactive bowel sounds throughout.  Extremities without cyanosis, 

clubbing. Posterior calves are nontender.  Peripheral pulses are equal.  Skin is

warm and dry.  Patient is awake, alert, oriented x3.  Cranial nerves II - XII 

are grossly intact without focal neurological deficits.  Strength is plus 5 out 

of 5 bilateral upper and lower extremities.  Sensation is intact.  Reflexes 

symmetrical.  Intact finger-nose-finger, rapid alternating movements, 

heel-to-shin.





Course





- Re-evaluation


Re-evalutation: 





12/31/19 14:05


Patient presents emergency department for evaluation.  He had initial laboratory

investigations as ordered through triage.  The pain in his head is reproducible,

I do suspect this is musculoskeletal.  Laboratory investigations thus far 

unremarkable.  Patient had a negative stress test last year, I am not overly 

concerned that this chest pain is secondary to angina/ACS.  Awaiting INR and 

repeat troponin.  We will go ahead and add CT scan of the head.  Patient 

remained stable, we will continue to monitor.


12/31/19 17:50


INR therapeutic.  Repeat troponin undetectable.  Will have patient follow-up 

with primary care/cardiology.  He is to return to the ED with worsening.





12/31/19 17:51








- Vital Signs


Vital signs: 


                                        











Temp Pulse Resp BP Pulse Ox


 


 99.1 F   72   20   143/83 H  96 


 


 12/31/19 12:29  12/31/19 12:29  12/31/19 12:29  12/31/19 12:29  12/31/19 12:29














- Laboratory


Result Diagrams: 


                                 12/31/19 12:54





                                 12/31/19 12:54


Laboratory results interpreted by me: 


                                        











  12/31/19 12/31/19 12/31/19





  12:54 12:54 12:54


 


RDW  16.7 H  


 


PT    23.3 H


 


Carbon Dioxide   31 H 


 


Creatine Kinase   180 H 














- Diagnostic Test


Radiology reviewed: Reports reviewed


Radiology results interpreted by me: 





12/31/19 16:16





                                        





Chest X-Ray  12/31/19 12:41


IMPRESSION:  No definite acute cardiopulmonary process.  Stable right mid lung 

and left lingular opacities likely scarring.


 








Head CT  12/31/19 13:58


IMPRESSION:  NO ACUTE INTRACRANIAL IMAGING FINDINGS.


EVIDENCE OF ACUTE STROKE: NO.


 














- EKG Interpretation by Me


Additional EKG results interpreted by me: 





12/31/19 14:06


Sinus mechanism with rate of 74 bpm.  Normal axis and intervals, no acute ST 

changes concerning for ischemia or infarction.





Discharge





- Discharge


Clinical Impression: 


Chest pain


Qualifiers:


 Chest pain type: unspecified Qualified Code(s): R07.9 - Chest pain, unspecified





Headache


Qualifiers:


 Intractability: not intractable 





Condition: Stable


Disposition: HOME, SELF-CARE


Instructions:  Chest Pain of Unclear Cause ([facility]), Headache ([facility])


Additional Instructions: 


No clear cause was found for your chest pain today.  Rest.  Follow-up with your 

primary care doctor and cardiology this week.  Continue your home medications as

prescribed.  Return to the emergency department with worsening or new concerning

symptoms of any sort.


Referrals: 


DARIEN GUERRERO MD [Primary Care Provider] - Follow up as needed

## 2020-07-23 NOTE — RADIOLOGY REPORT (SQ)
EXAM DESCRIPTION:  CHEST 2 VIEWS



COMPLETED DATE/TIME:  12/31/2019 1:11 pm



REASON FOR STUDY:  cp



COMPARISON:  9/11/2014



EXAM PARAMETERS:  NUMBER OF VIEWS: two views

TECHNIQUE: Digital Frontal and Lateral radiographic views of the chest acquired.

RADIATION DOSE: NA

LIMITATIONS: none



FINDINGS:  LUNGS AND PLEURA: Unchanged linear right mid lung and left lingular opacities, likely scar
ring.  No new opacities, masses or pneumothorax. No pleural effusion.

MEDIASTINUM AND HILAR STRUCTURES: No masses or contour abnormalities.

HEART AND VASCULAR STRUCTURES: Heart normal size.  No evidence for failure.

BONES: No acute findings.

HARDWARE: None in the chest.

OTHER: No other significant finding.



IMPRESSION:  No definite acute cardiopulmonary process.  Stable right mid lung and left lingular opac
ities likely scarring.



TECHNICAL DOCUMENTATION:  JOB ID:  [id number]

 2011 [employer]- All Rights Reserved



Reading location - IP/workstation name: [email or web address]
EXAM DESCRIPTION:  CT HEAD WITHOUT



COMPLETED DATE/TIME:  12/31/2019 2:12 pm



REASON FOR STUDY:  headache, blurred vision



COMPARISON:  None.



TECHNIQUE:  Axial images acquired through the brain without intravenous contrast.  Images reviewed wi
th bone, brain and subdural windows.  Additional sagittal and coronal reconstructions were generated.
 Images stored on PACS.

All CT scanners at this facility use dose modulation, iterative reconstruction, and/or weight based d
osing when appropriate to reduce radiation dose to as low as reasonably achievable (ALARA).

[facility]: Dose Right  [facility]: CareDose    [facility]: Dose Right    [facility]: Teradose 4D    [facility]: Smart Technologies



RADIATION DOSE:  CT Rad equipment meets quality standard of care and radiation dose reduction techniq
ues were employed. CTDIvol: 53.2 mGy. DLP: 1017 mGy-cm. mGy.



LIMITATIONS:  None.



FINDINGS:  VENTRICLES: Normal size and contour for patient age.

CEREBRUM: No masses.  No hemorrhage.  No midline shift.  No evidence for acute infarction. Normal gra
y/white matter differentiation. No areas of low density in the white matter.

CEREBELLUM: No masses.  No hemorrhage.  No alteration of density.  No evidence for acute infarction.

EXTRAAXIAL SPACES: No fluid collections.  No masses.  Partially empty sella turcica, a nonspecific fi
nding.  Extra-axial prominence compatible with parenchymal volume loss.

ORBITS AND GLOBE: No intra- or extraconal masses.  Normal contour of globe without masses.

CALVARIUM: No fracture.

PARANASAL SINUSES: Mild mucosal thickening in the ethmoid air cells.  Remaining sinuses are clear.

SOFT TISSUES: No mass or hematoma.

OTHER: No other significant finding.



IMPRESSION:  NO ACUTE INTRACRANIAL IMAGING FINDINGS.

EVIDENCE OF ACUTE STROKE: NO.



COMMENT:  Quality ID # 436: Final reports with documentation of one or more dose reduction techniques
 (e.g., Automated exposure control, adjustment of the mA and/or kV according to patient size, use of 
iterative reconstruction technique)



TECHNICAL DOCUMENTATION:  JOB ID:  [id number]

 2011 [employer]- All Rights Reserved



Reading location - IP/workstation name: [email or web address]
normal/dry and intact

## 2020-09-28 NOTE — RADIOLOGY REPORT (SQ)
EXAM DESCRIPTION:  CT CHEST WITHOUT



IMAGES COMPLETED DATE/TIME:  9/28/2020 8:47 am



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



COMPARISON:  None.



TECHNIQUE:  CT scan performed of the chest without intravenous contrast.  Images reviewed with lung, 
soft tissue and bone windows.  Reconstructed coronal and sagittal MPR images reviewed.  All images st
ored on PACS.

All CT scanners at this facility use dose modulation, iterative reconstruction, and/or weight based d
osing when appropriate to reduce radiation dose to as low as reasonably achievable (ALARA).

[facility]: Dose Right  [facility]: CareDose    [facility]: Dose Right    [facility]: Teradose 4D    [facility]: Smart [employer]



RADIATION DOSE:  CT Rad equipment meets quality standard of care and radiation dose reduction techniq
ues were employed. CTDIvol: 18.7 mGy. DLP: 726 mGy-cm.



LIMITATIONS:  No technical limitations.



FINDINGS:  LUNGS AND PLEURA: Un chest range subpleural blebs in the left apex, lower lobe predominant
 bronchiectasis, calcified nodular opacities in the right lower lobe (image 36 of series 2), and scat
tered bilateral peripheral curvilinear opacities that could represent scars.  There is no acute conso
lidation, ground-glass opacification, pleural effusion or pulmonary nodule.

HILAR AND MEDIASTINAL STRUCTURES: Evaluation of the murali for adenopathy is limited due to the absence
 of intravenous contrast.  There is no mediastinal adenopathy or mass.

HEART AND VASCULAR STRUCTURES: Calcification of the aortic valve leaflets.  The ascending thoracic ao
rta measures 4.1 x 3.8 cm.  There is no cardiomegaly or pericardial effusion.

UPPER ABDOMEN: Stable hyperdense 2.4 x 2 cm exophytic lesion that projects in the anterior cortex of 
the upper pole of the right kidney.

THYROID AND OTHER SOFT TISSUES: No mass or adenopathy.

BONES: No acute findings.

HARDWARE: None in the chest.

OTHER: No other findings.



IMPRESSION:  1. Unchanged appearance of the lung parenchyma.  There is no acute cardiopulmonary proce
ss.

2.  Calcified aortic valve leaflets ; the ascending thoracic aorta measures 4.1 x 3.8 cm.



TECHNICAL DOCUMENTATION:  JOB ID:  [id number]

Quality ID # 436: Final reports with documentation of one or more dose reduction techniques (e.g., Au
tomated exposure control, adjustment of the mA and/or kV according to patient size, use of iterative 
reconstruction technique)

 2011 [employer]- All Rights Reserved



Reading location - IP/workstation name: [email or web address]